# Patient Record
(demographics unavailable — no encounter records)

---

## 2025-07-02 NOTE — INTERPRETER SERVICES
[Language Line ] : provided by [employer]   [Interpreters_IDNumber] : [id number] [Interpreters_FullName] : Dedrick [TWNoteComboBox1] : Trin[place]adian

## 2025-07-02 NOTE — HISTORY OF PRESENT ILLNESS
[FreeTextEntry1] : Follow up visit C/o cough and sinus pressure S/p fall today [de-identified] : Patient is a 58 year old female with PMH of HTN, GERD, Hypothyroidism, low back pain, hemorrhoids, arthritis, fatty liver, costochondritis,  osteopenia, HLD,  Patient presents for follow up for her chronic medical conditions. She reports compliance with all prescribed medical therapy and dietary Came today with c/o cough for 2 wks, headache- used just cough syrup, c/o sinus pressure Also c/o diarrhea for 3 days-  3-4 times per day, feels better today Patient fell down on the way back from bathroom , she trapped due to her shoes was lose - as per patient she fell down on her right shoulder, right elbow and right hand Patient stopped Sertraline Currently on clonazePAM 2 MG Oral Tablet !/4 tab QHS Ergocalciferol 1.25 MG (55790 UT) Oral Capsule; TAKE 1 CAPSULE WEEKLY Proctosol HC 2.5 % External Cream; USE SMALL AMOUNT RECTALLY THREE TIMES DAILY FOR 3 DAYS AS NEEDED Simvastatin 20 MG Oral Tablet; take 1 tablet at bedtime